# Patient Record
(demographics unavailable — no encounter records)

---

## 2025-07-30 NOTE — HISTORY OF PRESENT ILLNESS
[FreeTextEntry1] : Patient is 55 years old para 0-0-0-0 last menstrual period June 2021 She complains of menopausal symptoms including hot flashes, insomnia, vaginal dryness and dyspareunia Patient also complains of recurrent UTIs

## 2025-07-30 NOTE — DISCUSSION/SUMMARY
[FreeTextEntry1] : Pap done Self breast exam stressed Prescribed yearly bilateral screening mammogram and bilateral breast ultrasound prescribed pelvic ultrasound  Patient is aware of risks, benefits and alternatives to HRT Risks include but are not limited to risk of Breast Cancer, Uterine Cancer, DVT (Deep Vein Thrombosis), PE (Pulmonary Embolism), CVA (Cerebrovascular Accident)/Stroke). The patient is aware of these risks and others and is considering treatment with HRT The patient understands the importance of yearly mammograms along with other screenings and follow up evaluations in order to be safely maintained on a hormonal regimen.  Patient states that she would like to discuss options with her  who is a physician (podiatrist)  Fezolinetant neurokinin 3 (NK3) receptor antagonist also discussed including risk benefits and alternatives.  Prescribed hormone profile with , CEA, ALT, AST, and serum bilirubin (total and direct)  Follow-up 6 months or as needed

## 2025-07-30 NOTE — PHYSICAL EXAM
[MA] : MA [Appropriately responsive] : appropriately responsive [Alert] : alert [No Acute Distress] : no acute distress [No Lymphadenopathy] : no lymphadenopathy [Regular Rate Rhythm] : regular rate rhythm [No Murmurs] : no murmurs [Clear to Auscultation B/L] : clear to auscultation bilaterally [Soft] : soft [Non-tender] : non-tender [Non-distended] : non-distended [No HSM] : No HSM [No Lesions] : no lesions [No Mass] : no mass [Oriented x3] : oriented x3 [Examination Of The Breasts] : a normal appearance [] : implants [No Masses] : no breast masses were palpable [Labia Majora] : normal [Labia Minora] : normal [Normal] : normal [Uterine Adnexae] : normal [FreeTextEntry2] : Chelsi